# Patient Record
Sex: FEMALE | Race: WHITE | NOT HISPANIC OR LATINO
[De-identification: names, ages, dates, MRNs, and addresses within clinical notes are randomized per-mention and may not be internally consistent; named-entity substitution may affect disease eponyms.]

---

## 2017-01-09 ENCOUNTER — TRANSCRIPTION ENCOUNTER (OUTPATIENT)
Age: 52
End: 2017-01-09

## 2017-03-03 ENCOUNTER — EMERGENCY (EMERGENCY)
Facility: HOSPITAL | Age: 52
LOS: 1 days | Discharge: HOME | End: 2017-03-03
Attending: EMERGENCY MEDICINE
Payer: MEDICAID

## 2017-03-03 VITALS
SYSTOLIC BLOOD PRESSURE: 129 MMHG | HEART RATE: 74 BPM | WEIGHT: 205.91 LBS | RESPIRATION RATE: 16 BRPM | OXYGEN SATURATION: 100 % | DIASTOLIC BLOOD PRESSURE: 74 MMHG | HEIGHT: 59 IN | TEMPERATURE: 97 F

## 2017-03-03 DIAGNOSIS — T17.228A FOOD IN PHARYNX CAUSING OTHER INJURY, INITIAL ENCOUNTER: ICD-10-CM

## 2017-03-03 DIAGNOSIS — Z98.89 OTHER SPECIFIED POSTPROCEDURAL STATES: Chronic | ICD-10-CM
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
## 2017-03-03 DIAGNOSIS — E78.00 PURE HYPERCHOLESTEROLEMIA, UNSPECIFIED: ICD-10-CM
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
## 2017-03-03 DIAGNOSIS — Z87.11 PERSONAL HISTORY OF PEPTIC ULCER DISEASE: ICD-10-CM

## 2017-03-03 DIAGNOSIS — I10 ESSENTIAL (PRIMARY) HYPERTENSION: ICD-10-CM

## 2017-03-03 PROCEDURE — 99283 EMERGENCY DEPT VISIT LOW MDM: CPT | Mod: 25

## 2017-03-03 PROCEDURE — 42809 REMOVE PHARYNX FOREIGN BODY: CPT

## 2017-03-03 PROCEDURE — 42809 REMOVE PHARYNX FOREIGN BODY: CPT | Mod: 52

## 2017-03-03 PROCEDURE — 70360 X-RAY EXAM OF NECK: CPT

## 2017-03-03 PROCEDURE — 70360 X-RAY EXAM OF NECK: CPT | Mod: 26

## 2017-03-03 PROCEDURE — 99284 EMERGENCY DEPT VISIT MOD MDM: CPT | Mod: 25

## 2017-03-03 NOTE — ED PROVIDER NOTE - ATTENDING CONTRIBUTION TO CARE
I performed a history and physical examination of the patient and discussed their management with the NP. I reviewed the NP's note and agree with the documented findings and plan of care.  HPI: 52 yo F reports eating fish last night now with left throat pain.   EXAM: small bone in left tonsil. otherwise well appearing, no drooling, no SOB, no resp distress, no bleeding, no PTA.   MDM: Used alligator forceps to remove 1 cm bone fragment. Pt immediate relief. See procedure note. No complications. Will send home f/u with PMD. Return for bleeding or other complications.

## 2017-03-03 NOTE — ED PROVIDER NOTE - NS ED MD SCRIBE ATTENDING SCRIBE SECTIONS
HIV/VITAL SIGNS( Pullset)/HISTORY OF PRESENT ILLNESS/PAST MEDICAL/SURGICAL/SOCIAL HISTORY/PHYSICAL EXAM/DISPOSITION/REVIEW OF SYSTEMS

## 2017-03-03 NOTE — ED PROVIDER NOTE - PHYSICAL EXAMINATION
ENT- submandibular and anterior neck no palpable masses. Crepitus, no redness. Symmetric movement during swallowing.

## 2017-03-03 NOTE — ED PROVIDER NOTE - MEDICAL DECISION MAKING DETAILS
51 y.o F pt presents with possible fish bone in throat. Well appearing and no signs of distress. O2 sat 100%. X-ray neck to r/o foreign body.

## 2017-03-03 NOTE — ED PROVIDER NOTE - OBJECTIVE STATEMENT
52 y/o F pt w/ PMHX of HTN and HLD presents with a chief complaint of having a foreign body in throat today. Pt reports eating fish at 10 pm last night and believes she swallowed a fish bone. Pt now complaining of L anterior throat discomfort described as intermittent and worsening with swallowing. No alleviating factors. Pt denies fevers/chills, dysphagia, dyspnea, vomiting, or any other complaints. NKDA.

## 2017-03-03 NOTE — ED PROVIDER NOTE - PROGRESS NOTE DETAILS
Dr Panda removed a fishbone from the left tonsil. Patient tolerated well. Will discharge with PMD follow up. Pt is well appearing walking with steady gait, stable for discharge and follow up without fail with medical doctor. I had a detailed discussion with the patient and/or guardian regarding the historical points, exam findings, and any diagnostic results supporting the discharge diagnosis. Pt educated on care and need for follow up. Strict return instructions and red flag signs and symptoms discussed with patient. Questions answered. Pt shows understanding of discharge information and agrees to follow. The scribe's documentation has been prepared under my direction and personally reviewed by me in its entirety. I confirm that the note above actually reflects all work, treatment, procedures, and medical decision-making performed by me - KATHY Wooten

## 2017-03-27 ENCOUNTER — EMERGENCY (EMERGENCY)
Facility: HOSPITAL | Age: 52
LOS: 1 days | Discharge: HOME | End: 2017-03-27
Attending: EMERGENCY MEDICINE
Payer: MEDICAID

## 2017-03-27 VITALS
OXYGEN SATURATION: 99 % | SYSTOLIC BLOOD PRESSURE: 125 MMHG | DIASTOLIC BLOOD PRESSURE: 88 MMHG | RESPIRATION RATE: 18 BRPM | WEIGHT: 220.02 LBS | HEIGHT: 59 IN | HEART RATE: 85 BPM | TEMPERATURE: 98 F

## 2017-03-27 DIAGNOSIS — Z98.89 OTHER SPECIFIED POSTPROCEDURAL STATES: Chronic | ICD-10-CM

## 2017-03-27 PROCEDURE — 73564 X-RAY EXAM KNEE 4 OR MORE: CPT | Mod: 26,50

## 2017-03-27 PROCEDURE — 73130 X-RAY EXAM OF HAND: CPT

## 2017-03-27 PROCEDURE — 99283 EMERGENCY DEPT VISIT LOW MDM: CPT

## 2017-03-27 PROCEDURE — 73130 X-RAY EXAM OF HAND: CPT | Mod: 26,RT

## 2017-03-27 PROCEDURE — 73564 X-RAY EXAM KNEE 4 OR MORE: CPT

## 2017-03-27 PROCEDURE — 99284 EMERGENCY DEPT VISIT MOD MDM: CPT | Mod: 25

## 2017-03-27 RX ORDER — IBUPROFEN 200 MG
600 TABLET ORAL ONCE
Qty: 0 | Refills: 0 | Status: COMPLETED | OUTPATIENT
Start: 2017-03-27 | End: 2017-03-27

## 2017-03-27 NOTE — ED PROVIDER NOTE - OBJECTIVE STATEMENT
" I fell"  51 year old with complaint of pain to bilat knees and right hand, s/p slip and fall on wet surface while leaving an apt building today   no head trauma no loc able to walk without asst after fall.  hx of htn, high chol

## 2017-03-27 NOTE — ED ADULT NURSE NOTE - OBJECTIVE STATEMENT
presented with c/o B/L knee pain and Rt hand pain S/P tripped and fall today denies head injury LOC. B/L knee with ecchymosis mild swelling  noted

## 2017-03-31 DIAGNOSIS — S80.02XA CONTUSION OF LEFT KNEE, INITIAL ENCOUNTER: ICD-10-CM

## 2017-03-31 DIAGNOSIS — I10 ESSENTIAL (PRIMARY) HYPERTENSION: ICD-10-CM
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
## 2017-03-31 DIAGNOSIS — E78.5 HYPERLIPIDEMIA, UNSPECIFIED: ICD-10-CM

## 2017-03-31 DIAGNOSIS — S60.221A CONTUSION OF RIGHT HAND, INITIAL ENCOUNTER: ICD-10-CM

## 2017-03-31 DIAGNOSIS — S80.01XA CONTUSION OF RIGHT KNEE, INITIAL ENCOUNTER: ICD-10-CM
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
## 2018-06-06 ENCOUNTER — EMERGENCY (EMERGENCY)
Facility: HOSPITAL | Age: 53
LOS: 1 days | Discharge: HOME | End: 2018-06-06
Attending: EMERGENCY MEDICINE
Payer: MEDICAID

## 2018-06-06 VITALS
HEART RATE: 70 BPM | RESPIRATION RATE: 16 BRPM | TEMPERATURE: 98 F | OXYGEN SATURATION: 97 % | DIASTOLIC BLOOD PRESSURE: 84 MMHG | SYSTOLIC BLOOD PRESSURE: 127 MMHG

## 2018-06-06 DIAGNOSIS — Z98.89 OTHER SPECIFIED POSTPROCEDURAL STATES: Chronic | ICD-10-CM

## 2018-06-06 PROCEDURE — 99284 EMERGENCY DEPT VISIT MOD MDM: CPT

## 2018-06-06 PROCEDURE — 73630 X-RAY EXAM OF FOOT: CPT | Mod: 26,LT

## 2018-06-06 PROCEDURE — 99283 EMERGENCY DEPT VISIT LOW MDM: CPT | Mod: 25

## 2018-06-06 PROCEDURE — 73630 X-RAY EXAM OF FOOT: CPT

## 2018-06-06 RX ORDER — IBUPROFEN 200 MG
400 TABLET ORAL ONCE
Qty: 0 | Refills: 0 | Status: COMPLETED | OUTPATIENT
Start: 2018-06-06 | End: 2018-06-06

## 2018-06-06 RX ORDER — IBUPROFEN 200 MG
1 TABLET ORAL
Qty: 20 | Refills: 0 | OUTPATIENT
Start: 2018-06-06

## 2018-06-06 RX ADMIN — Medication 400 MILLIGRAM(S): at 23:09

## 2018-06-06 NOTE — ED PROVIDER NOTE - LOWER EXTREMITY EXAM, LEFT
L foot dorsal border tenderness, no L ankle tenderness;  no bony deformities, no leg length discrepancy, femoral and pedal pulses intact, cap refill  < 2 secs.

## 2018-06-06 NOTE — ED PROVIDER NOTE - MEDICAL DECISION MAKING DETAILS
Pt is neurovascularly intact in ace wrap and hard shoe, ambulating with cane. Pt is well appearing, stable for discharge and follow up with medical doctor. Pt educated on care and need for follow up. Discussed anticipatory guidance and return precautions. Questions answered. I had a detailed discussion with the patient and/or guardian regarding the historical points, exam findings, and any diagnostic results supporting the discharge diagnosis. . Pt given f/u with podiatry.

## 2018-06-06 NOTE — ED PROVIDER NOTE - OBJECTIVE STATEMENT
54 y/o F pt with PMHx of Gastric Ulcer, HTN (on Losartan), and High Cholesterol and PSHx of Ovarian Cystectomy presents with family to ED c/o L foot pain s/p mechanical trip and fall at 11:30am today (x10 hours PTA in ED). Pt reports walking on the street when she twisted her L foot, causing pt to fall to the ground. Pt denies head trauma, LOC, numbness, tingling, or any other complaints. NKDA.

## 2018-06-06 NOTE — ED ADULT NURSE NOTE - OBJECTIVE STATEMENT
Pt AOx3, ambulatory, c/o pain in left foot s/p trip and fall. Pt denies trauma to head, LOC, dizziness. No deformity no swelling noted.

## 2022-06-02 ENCOUNTER — EMERGENCY (EMERGENCY)
Facility: HOSPITAL | Age: 57
LOS: 1 days | Discharge: HOME | End: 2022-06-02
Attending: EMERGENCY MEDICINE
Payer: MEDICAID

## 2022-06-02 VITALS
TEMPERATURE: 98 F | DIASTOLIC BLOOD PRESSURE: 76 MMHG | WEIGHT: 220.02 LBS | RESPIRATION RATE: 16 BRPM | HEIGHT: 59 IN | SYSTOLIC BLOOD PRESSURE: 134 MMHG | OXYGEN SATURATION: 98 % | HEART RATE: 77 BPM

## 2022-06-02 DIAGNOSIS — Z98.89 OTHER SPECIFIED POSTPROCEDURAL STATES: Chronic | ICD-10-CM

## 2022-06-02 PROCEDURE — 29125 APPL SHORT ARM SPLINT STATIC: CPT | Mod: RT

## 2022-06-02 PROCEDURE — 99284 EMERGENCY DEPT VISIT MOD MDM: CPT | Mod: 25

## 2022-06-02 PROCEDURE — 73130 X-RAY EXAM OF HAND: CPT

## 2022-06-02 PROCEDURE — 73110 X-RAY EXAM OF WRIST: CPT

## 2022-06-02 PROCEDURE — 29125 APPL SHORT ARM SPLINT STATIC: CPT

## 2022-06-02 PROCEDURE — 73110 X-RAY EXAM OF WRIST: CPT | Mod: 26,RT

## 2022-06-02 PROCEDURE — 73130 X-RAY EXAM OF HAND: CPT | Mod: 26,RT

## 2022-06-02 RX ORDER — ACETAMINOPHEN 500 MG
650 TABLET ORAL ONCE
Refills: 0 | Status: COMPLETED | OUTPATIENT
Start: 2022-06-02 | End: 2022-06-02

## 2022-06-02 RX ADMIN — Medication 650 MILLIGRAM(S): at 20:59

## 2022-06-02 RX ADMIN — Medication 650 MILLIGRAM(S): at 20:29

## 2022-06-02 NOTE — ED PROVIDER NOTE - OBJECTIVE STATEMENT
57 year old female with PMHX of MS on multiple meds, gastric ulcer, HTN, high cholesterol, and no significant PSHx presents to the ED with complaints of right hand and arm injury after falling down today in the kitchen. Patient endorses right hand pain and no loss of consciousness. NKDA.

## 2022-06-02 NOTE — ED ADULT NURSE NOTE - CHIEF COMPLAINT QUOTE
pt states she slipped in the kitchen and fell on her right hand/arm, pt's only complaint is right hand arm pain, denies head or neck involvement

## 2022-06-02 NOTE — ED PROVIDER NOTE - NSFOLLOWUPINSTRUCTIONS_ED_ALL_ED_FT
ArabicBosnianCanadian FrenchEnglishHaitian CreoleKoreanPolishPortugueseRussianSpanishTagalogTraditional ChineseVietnamese                                                                                                                                                                                                                                                                                                                                                                                                                                                                                                                                                                                                                                                                                                                                                                                                                                                                                                                                                                                                   ??????? ??????? ?????    Metacarpal Fracture       ??????? ??????? ????? — ??? ????????? ??????????? (???????) ????? ?? ???? ?????? ????? ?????. ??? ?????, ??????? ???????? ?? ???????? ? ???????? ???????. ??????? ????? ????????? ??????? ????? ? ?????? ????? ? ?????????. ??????? ??????? ????? ????? ?????? ?????????? ????, ???????? ??????? ??? ????????????? ?????????.      ?????? ????????    ??? ??????????? ????? ???? ???????:  •????????.      •???????, ?????? ?????? ??????.      •???????, ???????????????? ???????????? ???????, ?????? ?????? ??? ????????????? ?????.        ??? ???????? ?????    ????????? ????? ????? ?????? ?????????? ? ?????, ???????:  •??????????? ??????????? ?????? ??????.      •????? ???????????, ??? ??????? ????? ?????????? ??????? ? ???????? (??????????).        ?????? ???????? ??? ?????????    ?????????? ????? ????:  •????, ??????? ??????????? ??? ???????? ???????? ??? ??????.      •????.      •???????????????.      •????????.      •????????????? ??????? ????? ?? ???????.      •??????? ????????? ????? ??????.      •?????????? ????? ??? ????? ?? ????? ??? ?????? (??????????).        ??? ??? ??????????????    ??? ????????? ????? ???? ??????????????? ? ??????:  •????? ????????? ? ??????? ???????.      •???????????? ????????????.       •????????.        ??? ??? ??????    ??????? ????? ?????? ??????? ?? ??????? ???????? ? ?? ????, ??? ???????????? ????? ????????? ????? ???????????? ???? ????? (??????????).•???? ????????? ????? ???????????? ?????????, ??? ???????? ???????????:  •?????? ???? ??? ???? ? ??????? ?????????? ??????.      •????????? ???????????? ????? ? ????????? ????????? ?????? (????????? ???? ????????? ?? ???????? ???????).      •???? ????????? ????????? ????? ?? ????????????, ??? ??????? ???? ?????:  •????????? ?????????? ?????????? ????????, ????? ???????? ??????? (???????? ????????? ? ?????????? ?????????, ????). ??? ???? ???????? ??? ??????????? ?????? ?? ???? ????? ???????????? ?????, ?????? ??? ?????.      •??????????? ??????? ? ????????? ????? ?? ????? ?????????????? ???????, ?????????? ??? ??????? (???????? ????????? ? ?????????? ?????????, ????).      •????????? ????? ?? ????? ??? ???????? (???????? ?????????).        •????? ?????????????, ??? ????? ????? ?????? ???? ??? ???? ????????? ??????.      ??????? ????? ????? ????????:  •?????? ???????????? ?????????? ? ???????, ????? ????????? ? ???, ??? ??????? ???????? ??????.      •???????????? ??? ???????????? ????? ?????? ????? ??? ????.        ???????? ???? ?????????? ????????? ??????????:    ? ?????? ????????? ????:     •?????? ???? ??? ??????? ????? ??????? ??????. ???????? ??? ?????? ??? ??????? ????? ??????? ??????.      •?????? ???? ???????????? ???? ?????? ????. ????????? ?????? ???????? ????? ? ????? ?????????? ??? ????????????.      •???????? ????, ???? ?? ?????????? ???????? ? ??????????? ? ???????, ??? ???? ??? ?????????? ????????? ? ??????.       •?? ?????? ???? ????? ? ??????.      ???? ??? ???????? ????:     • ?? ?????? ?? ?? ????? ????? ????? ?? ??? ??????? ?????????????. ??? ????? ?????? ????????? ?????.      • ?? ??????????? ?????? ??? ????, ????? ???????? ????. ??? ???????? ? ??? ???? ????????????? ????????.      •?????? ???? ?????????? ???? ?????? ???????? ???????. ????????? ?????? ???????? ????? ? ????? ?????????? ??? ????????????.      •?? ?????? ???????? ?????? ?? ????? ???? ?? ????? ?????. ?? ???????? ?????? ?? ???? ??? ????.      •?? ?????? ???? ????? ? ??????.      ???????     • ?? ?????????? ?????, ?? ???????? ? ?? ????????? ? ?????????????? ?????, ???? ??? ??????? ???? ?? ????????. ???????? ? ?????? ???????? ?????, ????? ?? ??? ????????? ???. ??? ????? ???? ????????? ?????? ?????????? ??????.    •???? ???? ??? ???????? ??????? ?? ?????????????????:  •?? ?????? ??.      •????? ?? ?????????? ????? ??? ???, ?????????? ?? ????????????????? ??????????.          ?????????? ????, ??????????????? ? ?????    •????????????? ??? ? ??????????? ???????, ???? ??? ?????????. ??? ???? ????:  •???? ??? ??????????? ??????? ????, ???????? ?? ??? ??????? ????? ??????? ??????.      •???????? ??? ? ??????????? ?????.      •?????????? ????????? ????? ????? ????? ? ??????? ??? ????? ????? ?????? ? ???????.      •????????????? ????? ?? ????? ?? 20 ????? 2–3 ???? ? ????.      •???? ???? ?????????? ????-???????, ??????? ???. ??? ????? ?????. ???? ?? ?? ?????????? ????, ????? ??? ?????, ?? ?????????? ??????????? ????? ??????????? ???? ???????.        •????? ???????? ????????, ????? ????????? ??????????? ? ????.      •????? ?? ?????? ??? ??????, ??????? ?????????????? ??????? ?????? (???????????) — ???? ?????? ??????.      ???? ????????????     • ?? ?????????? ??? ?? ??????????? ?????? ???????????? ?????.       •????????????? ? ????? ???????????? ????? ??? ??????? ????? ??????? ??????. ???????? ? ?????? ???????? ?????, ????? ???? ???????? ??????????? ??? ???.       •?????????? ?????????? ???????????? ??? ????????????, ??? ??????? ????? ??????? ??????.      ???????? ??????????     •???????? ? ?????? ???????? ?????, ??????? ?? ??? ???????? ???????? ?????????? ??? ?????????? ???????? ? ????? ? ??????? ???????????? ?????????????? ?????????.      •???????? ? ?????? ???????? ?????, ????? ??? ????? ????? ????????? ?????? ??????, ???? ?? ???? ????? ??????? ???? ??? ????.      ????? ????????     •?????????? ?????????????? ? ??????????? ????????????? ????????? ?????? ??? ??????? ????? ??????? ??????.      • ?? ???????????? ??????, ??? ???????? ??????? ??? ?????. ? ????? ???????? ????????? ????????, ??????????? ????? ? ?????????? ??? ????????, ????? ??? ??????????? ????????. ??? ????? ????????? ?????????? ?????. ???? ??? ?????? ?? ???? ???????? ??? ????? ??????, ?????????? ? ?????? ???????? ?????.      •????????? ?? ??? ?????? ???????????? ??????????. ??? ?????.        ?????????? ? ???????? ?????, ???? ? ???:    •????, ??????? ???????????? ??? ?? ????????, ???????? ?? ????? ????????.      •????????????? ??????????? ??? ????.      •?????????.      •??-??? ????? ??? ???? ??????? ?????????? ?????.        ?????????? ?????????? ?? ???????, ????:    •? ??? ??????? ????.    •??????????? ????????? ???? ????? ?????????? ????:  •????? ??? ????? ????? ?????? ??? ?????? ?????.      •?? ?????????? ??????????? ??? ???????? ????.          ??????    •??????? ??????? ????? — ??? ??????? ????? ?? ???? ?????? ????? ?????, ??????? ???????? ?? ???????? ? ???????? ???????.      •??????? ????? ?????? ??????? ?? ??????? ???????? ? ????, ??? ???????????? ????? ????????? ????? ???????????? ???? ?????.      •????????, ??? ??????????? ?????? ???? ??? ???? ? ??????? ?????????? ??????. ??? ????? ????????????? ???????? ??? ????????????? ?????????? ?????.      ??? ?????????? ?? ????? ???????? ??????, ??????????????? ????? ??????. ??????????? ???????? ????? ???????????? ??? ??????? ? ????? ??????? ??????.      Document Revised: 10/14/2021 Document Reviewed: 10/14/2021    Elsevier Patient Education © 2022 [employer]

## 2022-06-02 NOTE — ED ADULT NURSE NOTE - NSICDXPASTMEDICALHX_GEN_ALL_CORE_FT
normal... PAST MEDICAL HISTORY:  Gastric ulcer     High cholesterol     Hypertension       Multiple sclerosis

## 2022-06-02 NOTE — ED PROVIDER NOTE - NSICDXPASTMEDICALHX_GEN_ALL_CORE_FT
PAST MEDICAL HISTORY:  Gastric ulcer     High cholesterol     Hypertension       Multiple sclerosis      EOAE (evoked otoacoustic emission)

## 2022-06-02 NOTE — ED PROVIDER NOTE - PATIENT PORTAL LINK FT
You can access the FollowMyHealth Patient Portal offered by [facility] by registering at the following website: [email or web address]. By joining [facility]’s FollowMyHealth portal, you will also be able to view your health information using other applications (apps) compatible with our system.

## 2022-06-02 NOTE — ED PROVIDER NOTE - CARE PROVIDER_API CALL
Jeremiah Alvarado)  Surgery  [address], [address]  [place] [address]  Phone: [phone]  Fax: [phone]  Follow Up Time:

## 2022-06-06 PROBLEM — Z00.00 ENCOUNTER FOR PREVENTIVE HEALTH EXAMINATION: Status: ACTIVE | Noted: 2022-06-06

## 2022-06-07 ENCOUNTER — APPOINTMENT (OUTPATIENT)
Dept: ORTHOPEDIC SURGERY | Facility: CLINIC | Age: 57
End: 2022-06-07
Payer: MEDICAID

## 2022-06-07 VITALS — BODY MASS INDEX: 42.48 KG/M2 | WEIGHT: 225 LBS | HEIGHT: 61 IN

## 2022-06-07 DIAGNOSIS — G35 MULTIPLE SCLEROSIS: ICD-10-CM

## 2022-06-07 DIAGNOSIS — J45.909 UNSPECIFIED ASTHMA, UNCOMPLICATED: ICD-10-CM

## 2022-06-07 DIAGNOSIS — E78.00 PURE HYPERCHOLESTEROLEMIA, UNSPECIFIED: ICD-10-CM

## 2022-06-07 DIAGNOSIS — I10 ESSENTIAL (PRIMARY) HYPERTENSION: ICD-10-CM

## 2022-06-07 PROCEDURE — 26600 TREAT METACARPAL FRACTURE: CPT

## 2022-06-07 PROCEDURE — 99204 OFFICE O/P NEW MOD 45 MIN: CPT | Mod: 57

## 2022-06-07 NOTE — IMAGING
[Right] : right wrist [de-identified] : RIGHT HAND\par skin intact. mild swelling of hand. ecchymosis throughout hand.\par TTP to 5th metacarpal.\par good EPL, FPL. good finger extension, flex to loose fist. no scissoring. good finger abduction and adduction. \par SILT to median, ulnar, radial distribution. \par palpable radial pulse, brisk cap refill all digits.\par no triggering. [FreeTextEntry1] : @[facility] 6/2/22: displaced 5th metacarpal base intra-articular fx. well-corticated ossification adjacent to ulnar styloid. [FreeTextEntry8] : @[facility] 6/2/22: displaced 5th metacarpal base intra-articular fx. well-corticated ossification adjacent to ulnar styloid.

## 2022-06-07 NOTE — HISTORY OF PRESENT ILLNESS
[Sudden] : sudden [9] : 9 [4] : 4 [Dull/Aching] : dull/aching [Sharp] : sharp [Intermittent] : intermittent [Rest] : rest [de-identified] : 6/7/22: 56yo RHD F (unemployed) presents with son for RIGHT hand/wrist pain after she lost her balance and fell against the kitchen counter on 6/2/22. She reports that she has balance issues due to her MS.\par Went to [facility] ER => XR showed fx, placed in ulnar gutter splint.\par Taking Tylenol and Motrin for pain.\par Ambulates with wheeled walker.\par Reports hx RIGHT wrist fx 27y ago treated non-surgically.\par \par Hx: MS. HTN. [] : no [FreeTextEntry1] : right small finger and the ulnar aspect of the right wrist [FreeTextEntry9] : Tylenol and Motrin [de-identified] : pressure

## 2022-06-07 NOTE — ASSESSMENT
[FreeTextEntry1] : The condition was explained to the patient. Fracture alignment within acceptable limits. Plan to proceed with non-surgical treatment.\par Discussed that there will be a permanent deformity at the fracture site.\par Risks include, but are not limited to persistent pain, stiffness, post-traumatic arthritis, fracture displacement, need for future surgery, mal-union, non-union. Patient expressed understanding.\par - applied ulnar gutter splint. reviewed splint care instructions.\par - elevate hand above level of heart as much as possible to reduce swelling.\par - recommend ice and OTC pain medications such as Tylenol and NSAIDs as needed, provided there are no contra-indicated medical conditions (eg liver disease, kidney disease, or GI ulcer/bleeding) or medications (eg blood thinners). Discussed possible GI and blood pressure side effects.\par - NWB to R hand.\par \par F/u 1wk. X-rays R hand in splint.

## 2022-06-08 PROBLEM — G35 MULTIPLE SCLEROSIS: Chronic | Status: ACTIVE | Noted: 2022-06-02

## 2022-06-14 ENCOUNTER — APPOINTMENT (OUTPATIENT)
Dept: ORTHOPEDIC SURGERY | Facility: CLINIC | Age: 57
End: 2022-06-14
Payer: MEDICAID

## 2022-06-14 PROCEDURE — 99024 POSTOP FOLLOW-UP VISIT: CPT

## 2022-06-14 PROCEDURE — 73130 X-RAY EXAM OF HAND: CPT | Mod: RT

## 2022-06-14 NOTE — IMAGING
[Right] : right hand [de-identified] : RIGHT HAND\par ulnar gutter splint intact.\par good flex/ext of digits outside splint.\par SILT to median, ulnar, radial distribution. \par palpable radial pulse, brisk cap refill all digits.\par no triggering. [FreeTextEntry1] : stable position/alignment of 5th metacarpal base intra-articular fx. well-corticated ossification adjacent to ulnar styloid.

## 2022-06-14 NOTE — HISTORY OF PRESENT ILLNESS
[5] : 5 [0] : 0 [Sharp] : sharp [Intermittent] : intermittent [Rest] : rest [Meds] : meds [de-identified] : 6/14/22: 1.5wks s/p RIGHT 5th metacarpal base fx 6/2/22. in ulnar gutter splint. no splint issues.\par \par 6/7/22: 56yo RHD F (unemployed) presents with son for RIGHT hand/wrist pain after she lost her balance and fell against the kitchen counter on 6/2/22. She reports that she has balance issues due to her MS.\par Went to [facility] ER => XR showed fx, placed in ulnar gutter splint.\par Taking Tylenol and Motrin for pain.\par Ambulates with wheeled walker.\par Reports hx RIGHT wrist fx 27y ago treated non-surgically.\par \par Hx: MS. HTN. [] : no [FreeTextEntry1] : right hand  [FreeTextEntry5] : pt reports pain in hand is better since last visit. takes Motrin at night for pain but it is causing stomach upset. pain primarily in pinky  [FreeTextEntry7] : wrist to forearm  [de-identified] : movement of hand/fingers

## 2022-06-14 NOTE — ASSESSMENT
[FreeTextEntry1] : - maintain ulnar gutter splint.\par - NWB to R hand.\par \par F/u 2wks. will transition to ulnar gutter brace at that time (Rx given). X-rays R hand out of splint.

## 2022-06-27 NOTE — ED PROVIDER NOTE - CROS ED ALLERGIC IMMUN ALL NEG
Patients wife came for an appointment with another provider and gave MA a [place] Living Will for patient.  Living Will sent to scanning     negative...

## 2022-06-28 ENCOUNTER — APPOINTMENT (OUTPATIENT)
Dept: ORTHOPEDIC SURGERY | Facility: CLINIC | Age: 57
End: 2022-06-28
Payer: MEDICAID

## 2022-06-28 VITALS — WEIGHT: 225 LBS | BODY MASS INDEX: 42.48 KG/M2 | HEIGHT: 61 IN

## 2022-06-28 PROCEDURE — 73130 X-RAY EXAM OF HAND: CPT | Mod: RT

## 2022-06-28 PROCEDURE — 99024 POSTOP FOLLOW-UP VISIT: CPT

## 2022-06-28 NOTE — HISTORY OF PRESENT ILLNESS
[Sharp] : sharp [Intermittent] : intermittent [Rest] : rest [3] : 3 [1] : 2 [Dull/Aching] : dull/aching [de-identified] : 6/28/22: 3.5wks s/p RIGHT 5th metacarpal base fx 6/2/22. in ulnar gutter splint. no splint issues.\par \par 6/14/22: 1.5wks s/p RIGHT 5th metacarpal base fx 6/2/22. in ulnar gutter splint. no splint issues.\par \par 6/7/22: 58yo RHD F (unemployed) presents with son for RIGHT hand/wrist pain after she lost her balance and fell against the kitchen counter on 6/2/22. She reports that she has balance issues due to her MS.\par Went to [facility] ER => XR showed fx, placed in ulnar gutter splint.\par Taking Tylenol and Motrin for pain.\par Ambulates with wheeled walker.\par Reports hx RIGHT wrist fx 27y ago treated non-surgically.\par \par Hx: MS. HTN. [] : no [FreeTextEntry1] : right hand  [FreeTextEntry7] : wrist to forearm  [de-identified] : movement of hand/fingers

## 2022-06-28 NOTE — ASSESSMENT
[FreeTextEntry1] : - removed ulnar gutter splint. wear ulnar gutter brace for at-risk activity and sleep x 2wks, then d/c.\par - prescribed PT for R hand.\par - NWB to R hand.\par \par F/u 2wks. X-rays R hand.

## 2022-07-12 ENCOUNTER — APPOINTMENT (OUTPATIENT)
Dept: ORTHOPEDIC SURGERY | Facility: CLINIC | Age: 57
End: 2022-07-12

## 2022-07-12 VITALS — HEIGHT: 61 IN | BODY MASS INDEX: 42.48 KG/M2 | WEIGHT: 225 LBS

## 2022-07-12 PROCEDURE — 99024 POSTOP FOLLOW-UP VISIT: CPT

## 2022-07-12 PROCEDURE — 73130 X-RAY EXAM OF HAND: CPT | Mod: RT

## 2022-07-12 RX ORDER — ASPIRIN 81 MG/1
81 TABLET, COATED ORAL
Qty: 30 | Refills: 0 | Status: ACTIVE | COMMUNITY
Start: 2022-07-07

## 2022-07-12 RX ORDER — OMEGA-3-ACID ETHYL ESTERS CAPSULES 1 G/1
1 CAPSULE, LIQUID FILLED ORAL
Qty: 120 | Refills: 0 | Status: ACTIVE | COMMUNITY
Start: 2022-01-24

## 2022-07-12 RX ORDER — BLOOD-GLUCOSE METER
EACH MISCELLANEOUS
Qty: 20 | Refills: 0 | Status: ACTIVE | COMMUNITY
Start: 2022-01-24

## 2022-07-12 RX ORDER — ERGOCALCIFEROL 1.25 MG/1
1.25 MG CAPSULE, LIQUID FILLED ORAL
Qty: 4 | Refills: 0 | Status: ACTIVE | COMMUNITY
Start: 2022-01-24

## 2022-07-12 RX ORDER — BLOOD-GLUCOSE METER
30G X 5/16" EACH MISCELLANEOUS
Qty: 2 | Refills: 0 | Status: ACTIVE | COMMUNITY
Start: 2022-07-07

## 2022-07-12 RX ORDER — BUDESONIDE AND FORMOTEROL FUMARATE DIHYDRATE 160; 4.5 UG/1; UG/1
160-4.5 AEROSOL RESPIRATORY (INHALATION)
Qty: 10 | Refills: 0 | Status: ACTIVE | COMMUNITY
Start: 2022-07-07

## 2022-07-12 RX ORDER — FINGOLIMOD HCL 0.5 MG/1
0.5 CAPSULE ORAL
Qty: 30 | Refills: 0 | Status: ACTIVE | COMMUNITY
Start: 2022-01-12

## 2022-07-12 RX ORDER — CARBOXYMETHYLCELLULOSE SODIUM 0.5 G/100ML
0.5 SOLUTION/ DROPS OPHTHALMIC
Qty: 15 | Refills: 0 | Status: ACTIVE | COMMUNITY
Start: 2022-01-13

## 2022-07-12 RX ORDER — CYANOCOBALAMIN 1000 UG/ML
1000 INJECTION INTRAMUSCULAR; SUBCUTANEOUS
Qty: 2 | Refills: 0 | Status: ACTIVE | COMMUNITY
Start: 2022-07-07

## 2022-07-12 RX ORDER — GABAPENTIN 300 MG/1
300 CAPSULE ORAL
Qty: 30 | Refills: 0 | Status: ACTIVE | COMMUNITY
Start: 2022-07-07

## 2022-07-12 RX ORDER — LOSARTAN POTASSIUM 100 MG/1
100 TABLET, FILM COATED ORAL
Qty: 30 | Refills: 0 | Status: ACTIVE | COMMUNITY
Start: 2022-01-24

## 2022-07-12 RX ORDER — BACITRACIN 500 [IU]/G
500 OINTMENT TOPICAL
Qty: 28 | Refills: 0 | Status: ACTIVE | COMMUNITY
Start: 2022-06-22

## 2022-07-12 RX ORDER — SEMAGLUTIDE 1.34 MG/ML
4 INJECTION, SOLUTION SUBCUTANEOUS
Qty: 3 | Refills: 0 | Status: ACTIVE | COMMUNITY
Start: 2022-01-24

## 2022-07-12 RX ORDER — TERBINAFINE HYDROCHLORIDE 1 G/100G
1 CREAM TOPICAL
Qty: 30 | Refills: 0 | Status: ACTIVE | COMMUNITY
Start: 2022-01-24

## 2022-07-12 RX ORDER — PANTOPRAZOLE 40 MG/1
40 TABLET, DELAYED RELEASE ORAL
Qty: 30 | Refills: 0 | Status: ACTIVE | COMMUNITY
Start: 2022-01-24

## 2022-07-12 RX ORDER — METOPROLOL SUCCINATE 25 MG/1
25 TABLET, EXTENDED RELEASE ORAL
Qty: 30 | Refills: 0 | Status: ACTIVE | COMMUNITY
Start: 2022-01-24

## 2022-07-12 RX ORDER — BLOOD SUGAR DIAGNOSTIC
STRIP MISCELLANEOUS
Qty: 100 | Refills: 0 | Status: ACTIVE | COMMUNITY
Start: 2022-01-24

## 2022-07-12 RX ORDER — ATORVASTATIN CALCIUM 10 MG/1
10 TABLET, FILM COATED ORAL
Qty: 30 | Refills: 0 | Status: ACTIVE | COMMUNITY
Start: 2022-07-07

## 2022-07-12 RX ORDER — PEN NEEDLE, DIABETIC 32GX 5/32"
NEEDLE, DISPOSABLE MISCELLANEOUS
Qty: 100 | Refills: 0 | Status: ACTIVE | COMMUNITY
Start: 2022-07-10

## 2022-07-12 RX ORDER — SENNOSIDES 8.6 MG TABLETS 8.6 MG/1
8.6 TABLET ORAL
Qty: 60 | Refills: 0 | Status: ACTIVE | COMMUNITY
Start: 2022-01-24

## 2022-07-12 RX ORDER — ALCOHOL PREP PADS 0.7 ML/1
70 SWAB TOPICAL
Qty: 100 | Refills: 0 | Status: ACTIVE | COMMUNITY
Start: 2022-07-07

## 2022-07-12 RX ORDER — CHLORHEXIDINE GLUCONATE, 0.12% ORAL RINSE 1.2 MG/ML
0.12 SOLUTION DENTAL
Qty: 473 | Refills: 0 | Status: ACTIVE | COMMUNITY
Start: 2022-02-23

## 2022-07-12 NOTE — HISTORY OF PRESENT ILLNESS
[Gradual] : gradual [2] : 2 [0] : 0 [Dull/Aching] : dull/aching [Sharp] : sharp [Intermittent] : intermittent [Rest] : rest [Physical therapy] : physical therapy [Exercising] : exercising [de-identified] : 7/12/22: 5.5wks s/p RIGHT 5th metacarpal base fx 6/2/22. wearing ulnar gutter brace. had PT evaluation last week, started treatment today.\par \par 6/28/22: 3.5wks s/p RIGHT 5th metacarpal base fx 6/2/22. in ulnar gutter splint. no splint issues.\par \par 6/14/22: 1.5wks s/p RIGHT 5th metacarpal base fx 6/2/22. in ulnar gutter splint. no splint issues.\par \par 6/7/22: 58yo RHD F (unemployed) presents with son for RIGHT hand/wrist pain after she lost her balance and fell against the kitchen counter on 6/2/22. She reports that she has balance issues due to her MS.\par Went to [facility] ER => XR showed fx, placed in ulnar gutter splint.\par Taking Tylenol and Motrin for pain.\par Ambulates with wheeled walker.\par Reports hx RIGHT wrist fx 27y ago treated non-surgically.\par \par Hx: MS. HTN. [] : no [FreeTextEntry1] : right hand  [FreeTextEntry7] : wrist to forearm  [de-identified] : movement of hand/fingers [de-identified] : 07/12/2022

## 2022-07-12 NOTE — IMAGING
[Right] : right hand [de-identified] : RIGHT HAND\par mild swelling of ulnar hand.\par SF: good ext, flex 1cm to DPC. no scissoring.\par good flex/ext other digits.\par SILT to median, ulnar, radial distribution. \par palpable radial pulse, brisk cap refill all digits.\par no triggering. [FreeTextEntry1] : stable position/alignment of 5th metacarpal base intra-articular fx, interval healing. well-corticated ossification adjacent to ulnar styloid.

## 2022-07-12 NOTE — ASSESSMENT
[FreeTextEntry1] : - continue PT for R hand.\par - advance activity as tolerated.\par \par F/u 6wks. X-rays R hand.

## 2022-08-23 ENCOUNTER — APPOINTMENT (OUTPATIENT)
Dept: ORTHOPEDIC SURGERY | Facility: CLINIC | Age: 57
End: 2022-08-23

## 2022-08-23 PROCEDURE — 73130 X-RAY EXAM OF HAND: CPT | Mod: RT

## 2022-08-23 PROCEDURE — 99024 POSTOP FOLLOW-UP VISIT: CPT

## 2022-08-23 NOTE — IMAGING
[Right] : right hand [de-identified] : RIGHT HAND\par mild swelling of over dorsal 4/5 CMCJ\par TTP to 5th metacarpal.\par SF: good ext, flex to full fist. no scissoring.\par good flex/ext other digits.\par SILT to median, ulnar, radial distribution. \par palpable radial pulse, brisk cap refill all digits.\par no triggering. [FreeTextEntry1] : stable position/alignment of 5th metacarpal base intra-articular fx, interval healing. well-corticated ossification adjacent to ulnar styloid.

## 2022-08-23 NOTE — ASSESSMENT
[FreeTextEntry1] : - renewed PT for R hand.\par - activity as tolerated.\par \par F/u 6wks. Note Text (......Xxx Chief Complaint.): This diagnosis correlates with the Other (Free Text): continue: humira twice monthly for 3 more months, if this doesn't help resolve symptoms, we will considering switching to cosentyx or  taltz\\n Detail Level: Zone

## 2022-08-23 NOTE — HISTORY OF PRESENT ILLNESS
[Gradual] : gradual [1] : 2 [Dull/Aching] : dull/aching [Sharp] : sharp [Intermittent] : intermittent [Rest] : rest [Physical therapy] : physical therapy [Exercising] : exercising [de-identified] : 8/23/22: 11.5wks s/p RIGHT 5th metacarpal base fx 6/2/22. continues to have pain and swelling at ulnar hand. +PT x 6 sessions.\par \par 7/12/22: 5.5wks s/p RIGHT 5th metacarpal base fx 6/2/22. wearing ulnar gutter brace. had PT evaluation last week, started treatment today.\par \par 6/28/22: 3.5wks s/p RIGHT 5th metacarpal base fx 6/2/22. in ulnar gutter splint. no splint issues.\par \par 6/14/22: 1.5wks s/p RIGHT 5th metacarpal base fx 6/2/22. in ulnar gutter splint. no splint issues.\par \par 6/7/22: 56yo RHD F (unemployed) presents with son for RIGHT hand/wrist pain after she lost her balance and fell against the kitchen counter on 6/2/22. She reports that she has balance issues due to her MS.\par Went to [facility] ER => XR showed fx, placed in ulnar gutter splint.\par Taking Tylenol and Motrin for pain.\par Ambulates with wheeled walker.\par Reports hx RIGHT wrist fx 27y ago treated non-surgically.\par \par Hx: MS. HTN. [] : no [FreeTextEntry1] : right hand  [FreeTextEntry5] : Patty is here today to F/U on her RT Hand Injury.\par Since last visit she states she has pain in the hand and still has swelling.\par Has been going to PT as well.\par Unable to still do full movements without there being pain.  [FreeTextEntry7] : wrist to forearm  [de-identified] : movement of hand/fingers [de-identified] : 07/12/2022

## 2022-10-11 ENCOUNTER — APPOINTMENT (OUTPATIENT)
Dept: ORTHOPEDIC SURGERY | Facility: CLINIC | Age: 57
End: 2022-10-11

## 2022-10-11 DIAGNOSIS — S62.316A DISPLACED FRACTURE OF BASE OF FIFTH METACARPAL BONE, RIGHT HAND, INITIAL ENCOUNTER FOR CLOSED FRACTURE: ICD-10-CM

## 2022-10-11 PROCEDURE — 99212 OFFICE O/P EST SF 10 MIN: CPT

## 2022-10-11 NOTE — HISTORY OF PRESENT ILLNESS
[Gradual] : gradual [1] : 2 [Dull/Aching] : dull/aching [Sharp] : sharp [Intermittent] : intermittent [Rest] : rest [Physical therapy] : physical therapy [Exercising] : exercising [de-identified] : 10/11/22: 4mo s/p RIGHT 5th metacarpal base fx 6/2/22. completed course of PT, performing HEP 3x/day. c/o persistent ulnar hand/wrist pain and swelling.\par \par 8/23/22: 11.5wks s/p RIGHT 5th metacarpal base fx 6/2/22. continues to have pain and swelling at ulnar hand. +PT x 6 sessions.\par \par 7/12/22: 5.5wks s/p RIGHT 5th metacarpal base fx 6/2/22. wearing ulnar gutter brace. had PT evaluation last week, started treatment today.\par \par 6/28/22: 3.5wks s/p RIGHT 5th metacarpal base fx 6/2/22. in ulnar gutter splint. no splint issues.\par \par 6/14/22: 1.5wks s/p RIGHT 5th metacarpal base fx 6/2/22. in ulnar gutter splint. no splint issues.\par \par 6/7/22: 58yo RHD F (unemployed) presents with son for RIGHT hand/wrist pain after she lost her balance and fell against the kitchen counter on 6/2/22. She reports that she has balance issues due to her MS.\par Went to [facility] ER => XR showed fx, placed in ulnar gutter splint.\par Taking Tylenol and Motrin for pain.\par Ambulates with wheeled walker.\par Reports hx RIGHT wrist fx 27y ago treated non-surgically.\par \par Hx: MS. HTN. [] : no [FreeTextEntry1] : right hand  [FreeTextEntry5] : Patty is here today to F/U on her RT Hand Injury.\par Since last visit she states she has pain in the hand and still has swelling.\par Used up all her PT and they went well. \par Unable to still do full movements without there being pain.  [FreeTextEntry7] : wrist to forearm  [de-identified] : movement of hand/fingers [de-identified] : 07/12/2022

## 2022-10-11 NOTE — IMAGING
[Right] : right hand [FreeTextEntry1] : stable position/alignment of 5th metacarpal base intra-articular fx, interval healing. well-corticated ossification adjacent to ulnar styloid. [de-identified] : RIGHT HAND\par mild swelling of over dorsal 5th CMCJ.\par TTP along 5th metacarpal, ulnar wrist, ECU tendon.\par good EPL, FPL. good finger extension, flex to full fist. no scissoring.\par SILT to median, ulnar, radial distribution. \par palpable radial pulse, brisk cap refill all digits.\par no triggering.\par \par no pain with pronation, supination.\par DRUJ shear => pain. no instability.\par negative ECU synergy.

## 2023-07-24 NOTE — ED ADULT TRIAGE NOTE - GLASGOW COMA SCALE: EYE OPENING, MLM
Quality 47: Advance Care Plan: Advance Care Planning discussed and documented in the medical record; patient did not wish or was not able to name a surrogate decision maker or provide an advance care plan. Quality 226: Preventive Care And Screening: Tobacco Use: Screening And Cessation Intervention: Patient screened for tobacco use and is an ex/non-smoker Detail Level: Detailed Quality 130: Documentation Of Current Medications In The Medical Record: Current Medications Documented (E4) spontaneous

## 2023-08-02 NOTE — ED ADULT NURSE NOTE - CINV DISCH MEDS REVIEWED YN
----- Message from Fish Ryan MD sent at 8/1/2023  5:36 PM CDT -----  Please notify the patient of normal results.  Follow-up as planned.    - Patient to follow up with GI specialist for acute concern on the visit.    Yes

## 2023-11-13 NOTE — ED PROVIDER NOTE - RESPIRATORY NEGATIVE STATEMENT, MLM
no chest pain, no cough, and no shortness of breath. As per pt report, uses axillary crutches fro 1 month./independent

## 2024-01-05 ENCOUNTER — NON-APPOINTMENT (OUTPATIENT)
Age: 59
End: 2024-01-05